# Patient Record
Sex: MALE | Race: OTHER | NOT HISPANIC OR LATINO | Employment: OTHER | URBAN - METROPOLITAN AREA
[De-identification: names, ages, dates, MRNs, and addresses within clinical notes are randomized per-mention and may not be internally consistent; named-entity substitution may affect disease eponyms.]

---

## 2021-12-30 ENCOUNTER — APPOINTMENT (EMERGENCY)
Dept: RADIOLOGY | Facility: HOSPITAL | Age: 76
End: 2021-12-30
Payer: MEDICARE

## 2021-12-30 ENCOUNTER — HOSPITAL ENCOUNTER (EMERGENCY)
Facility: HOSPITAL | Age: 76
Discharge: HOME | End: 2021-12-31
Attending: EMERGENCY MEDICINE | Admitting: EMERGENCY MEDICINE
Payer: MEDICARE

## 2021-12-30 DIAGNOSIS — E16.2 HYPOGLYCEMIA: Primary | ICD-10-CM

## 2021-12-30 DIAGNOSIS — R07.9 CHEST PAIN: ICD-10-CM

## 2021-12-30 LAB
ALBUMIN SERPL BCP-MCNC: 3.7 G/DL (ref 3.5–5)
ALP SERPL-CCNC: 82 U/L (ref 46–116)
ALT SERPL W P-5'-P-CCNC: 23 U/L (ref 12–78)
ANION GAP SERPL CALCULATED.3IONS-SCNC: 7 MMOL/L (ref 4–13)
AST SERPL W P-5'-P-CCNC: 18 U/L (ref 5–45)
BACTERIA UR QL AUTO: ABNORMAL /HPF
BASOPHILS # BLD AUTO: 0.05 THOUSANDS/ΜL (ref 0–0.1)
BASOPHILS NFR BLD AUTO: 1 % (ref 0–1)
BILIRUB SERPL-MCNC: 0.51 MG/DL (ref 0.2–1)
BILIRUB UR QL STRIP: NEGATIVE
BUN SERPL-MCNC: 16 MG/DL (ref 5–25)
CALCIUM SERPL-MCNC: 8.6 MG/DL (ref 8.3–10.1)
CARDIAC TROPONIN I PNL SERPL HS: 5 NG/L
CHLORIDE SERPL-SCNC: 103 MMOL/L (ref 100–108)
CLARITY UR: CLEAR
CLARITY, POC: CLEAR
CO2 SERPL-SCNC: 30 MMOL/L (ref 21–32)
COLOR UR: YELLOW
COLOR, POC: YELLOW
CREAT SERPL-MCNC: 1.2 MG/DL (ref 0.6–1.3)
EOSINOPHIL # BLD AUTO: 0.42 THOUSAND/ΜL (ref 0–0.61)
EOSINOPHIL NFR BLD AUTO: 5 % (ref 0–6)
ERYTHROCYTE [DISTWIDTH] IN BLOOD BY AUTOMATED COUNT: 14.2 % (ref 11.6–15.1)
EXT BILIRUBIN, UA: NEGATIVE
EXT BLOOD URINE: NEGATIVE
EXT GLUCOSE, UA: >1000
EXT KETONES: NEGATIVE
EXT NITRITE, UA: NEGATIVE
EXT PH, UA: 6
EXT PROTEIN, UA: NEGATIVE
EXT SPECIFIC GRAVITY, UA: 1.01
EXT UROBILINOGEN: 0.2
GFR SERPL CREATININE-BSD FRML MDRD: 57 ML/MIN/1.73SQ M
GLUCOSE SERPL-MCNC: 123 MG/DL (ref 65–140)
GLUCOSE SERPL-MCNC: 88 MG/DL (ref 65–140)
GLUCOSE UR STRIP-MCNC: ABNORMAL MG/DL
HCT VFR BLD AUTO: 42.7 % (ref 36.5–49.3)
HGB BLD-MCNC: 14.1 G/DL (ref 12–17)
HGB UR QL STRIP.AUTO: NEGATIVE
IMM GRANULOCYTES # BLD AUTO: 0.06 THOUSAND/UL (ref 0–0.2)
IMM GRANULOCYTES NFR BLD AUTO: 1 % (ref 0–2)
KETONES UR STRIP-MCNC: NEGATIVE MG/DL
LEUKOCYTE ESTERASE UR QL STRIP: ABNORMAL
LYMPHOCYTES # BLD AUTO: 1.11 THOUSANDS/ΜL (ref 0.6–4.47)
LYMPHOCYTES NFR BLD AUTO: 12 % (ref 14–44)
MCH RBC QN AUTO: 29.6 PG (ref 26.8–34.3)
MCHC RBC AUTO-ENTMCNC: 33 G/DL (ref 31.4–37.4)
MCV RBC AUTO: 90 FL (ref 82–98)
MONOCYTES # BLD AUTO: 0.74 THOUSAND/ΜL (ref 0.17–1.22)
MONOCYTES NFR BLD AUTO: 8 % (ref 4–12)
NEUTROPHILS # BLD AUTO: 6.94 THOUSANDS/ΜL (ref 1.85–7.62)
NEUTS SEG NFR BLD AUTO: 73 % (ref 43–75)
NITRITE UR QL STRIP: NEGATIVE
NON-SQ EPI CELLS URNS QL MICRO: ABNORMAL /HPF
NRBC BLD AUTO-RTO: 0 /100 WBCS
PH UR STRIP.AUTO: 6 [PH] (ref 4.5–8)
PLATELET # BLD AUTO: 141 THOUSANDS/UL (ref 149–390)
PMV BLD AUTO: 11.5 FL (ref 8.9–12.7)
POTASSIUM SERPL-SCNC: 3.4 MMOL/L (ref 3.5–5.3)
PROT SERPL-MCNC: 7.5 G/DL (ref 6.4–8.2)
PROT UR STRIP-MCNC: NEGATIVE MG/DL
RBC # BLD AUTO: 4.77 MILLION/UL (ref 3.88–5.62)
RBC #/AREA URNS AUTO: ABNORMAL /HPF
SODIUM SERPL-SCNC: 140 MMOL/L (ref 136–145)
SP GR UR STRIP.AUTO: 1.01 (ref 1–1.03)
UROBILINOGEN UR QL STRIP.AUTO: 0.2 E.U./DL
WBC # BLD AUTO: 9.32 THOUSAND/UL (ref 4.31–10.16)
WBC # BLD EST: NORMAL 10*3/UL
WBC #/AREA URNS AUTO: ABNORMAL /HPF

## 2021-12-30 PROCEDURE — 80053 COMPREHEN METABOLIC PANEL: CPT | Performed by: EMERGENCY MEDICINE

## 2021-12-30 PROCEDURE — 93005 ELECTROCARDIOGRAM TRACING: CPT

## 2021-12-30 PROCEDURE — 84484 ASSAY OF TROPONIN QUANT: CPT | Performed by: EMERGENCY MEDICINE

## 2021-12-30 PROCEDURE — 71046 X-RAY EXAM CHEST 2 VIEWS: CPT

## 2021-12-30 PROCEDURE — 81001 URINALYSIS AUTO W/SCOPE: CPT

## 2021-12-30 PROCEDURE — 99285 EMERGENCY DEPT VISIT HI MDM: CPT | Performed by: EMERGENCY MEDICINE

## 2021-12-30 PROCEDURE — 36415 COLL VENOUS BLD VENIPUNCTURE: CPT | Performed by: EMERGENCY MEDICINE

## 2021-12-30 PROCEDURE — 99285 EMERGENCY DEPT VISIT HI MDM: CPT

## 2021-12-30 PROCEDURE — 82948 REAGENT STRIP/BLOOD GLUCOSE: CPT

## 2021-12-30 PROCEDURE — 85025 COMPLETE CBC W/AUTO DIFF WBC: CPT | Performed by: EMERGENCY MEDICINE

## 2021-12-30 RX ORDER — PAROXETINE 10 MG/1
10 TABLET, FILM COATED ORAL DAILY
COMMUNITY
Start: 2021-11-09 | End: 2021-12-30 | Stop reason: ALTCHOICE

## 2021-12-30 RX ORDER — CLOPIDOGREL BISULFATE 75 MG/1
1 TABLET ORAL DAILY
COMMUNITY
Start: 2021-10-25

## 2021-12-30 RX ORDER — DULOXETIN HYDROCHLORIDE 60 MG/1
60 CAPSULE, DELAYED RELEASE ORAL DAILY
COMMUNITY

## 2021-12-30 RX ORDER — LOSARTAN POTASSIUM AND HYDROCHLOROTHIAZIDE 12.5; 5 MG/1; MG/1
1 TABLET ORAL DAILY
COMMUNITY
Start: 2021-10-25

## 2021-12-30 RX ORDER — ALPRAZOLAM 0.25 MG/1
0.25 TABLET ORAL 2 TIMES DAILY
COMMUNITY
Start: 2021-11-09

## 2021-12-31 VITALS
RESPIRATION RATE: 20 BRPM | DIASTOLIC BLOOD PRESSURE: 95 MMHG | HEART RATE: 60 BPM | SYSTOLIC BLOOD PRESSURE: 155 MMHG | OXYGEN SATURATION: 97 % | TEMPERATURE: 98.4 F

## 2021-12-31 LAB
2HR DELTA HS TROPONIN: 0 NG/L
ATRIAL RATE: 52 BPM
ATRIAL RATE: 60 BPM
CARDIAC TROPONIN I PNL SERPL HS: 5 NG/L
GLUCOSE SERPL-MCNC: 235 MG/DL (ref 65–140)
P AXIS: 35 DEGREES
P AXIS: 50 DEGREES
PR INTERVAL: 200 MS
PR INTERVAL: 212 MS
QRS AXIS: 55 DEGREES
QRS AXIS: 63 DEGREES
QRSD INTERVAL: 92 MS
QRSD INTERVAL: 98 MS
QT INTERVAL: 410 MS
QT INTERVAL: 410 MS
QTC INTERVAL: 381 MS
QTC INTERVAL: 410 MS
T WAVE AXIS: 40 DEGREES
T WAVE AXIS: 58 DEGREES
VENTRICULAR RATE: 52 BPM
VENTRICULAR RATE: 60 BPM

## 2021-12-31 PROCEDURE — 36415 COLL VENOUS BLD VENIPUNCTURE: CPT | Performed by: EMERGENCY MEDICINE

## 2021-12-31 PROCEDURE — 84484 ASSAY OF TROPONIN QUANT: CPT | Performed by: EMERGENCY MEDICINE

## 2021-12-31 PROCEDURE — 82948 REAGENT STRIP/BLOOD GLUCOSE: CPT

## 2021-12-31 PROCEDURE — 93005 ELECTROCARDIOGRAM TRACING: CPT

## 2021-12-31 PROCEDURE — 93010 ELECTROCARDIOGRAM REPORT: CPT | Performed by: INTERNAL MEDICINE

## 2021-12-31 NOTE — ED PROVIDER NOTES
History  Chief Complaint   Patient presents with    Hypoglycemia - Symptomatic     per EMS, pt was at [employer] and become diaphrotic, SOB, EMS reports low blood sugar, recieved d10, pt reports CP     Patient is a 78-year-old male that presents via EMS for a hypoglycemic episode  History provided by patient and his wife with the   She states that they were at [address] [facility] when she was in the back of the [employer] and noticed that he started to look pale and sweaty  She went up to him and he was slightly altered  She thought maybe his blood sugar was low and did give him some soda with good response  EMS was called and they read checked his blood sugar and it was 66  Unsure what it was before that  They did give him IV dextrose with good relief  Patient now complaining of chest pain but no other symptoms  Patient denies having chest pain prior to his hypoglycemic episode  Patient states he has been and a normal and good state of health without any changes of medications, missing medications, adding medications or recent illnesses  Patient states that he ate and drank normally today  Patient is on insulin and Januvia  History provided by:  Patient   used: Yes ([id number])    Hypoglycemia - Symptomatic  Initial blood sugar:  66  Blood sugar after intervention:  110  Severity:  Moderate  Onset quality:  Gradual  Associated symptoms: dizziness, sweats and weakness    Associated symptoms: no seizures, no shortness of breath and no vomiting        Prior to Admission Medications   Prescriptions Last Dose Informant Patient Reported? Taking?    ALPRAZolam (XANAX) 0 25 mg tablet   Yes Yes   Sig: Take 0 25 mg by mouth 2 (two) times a day   Cholecalciferol 50 MCG (2000 UT) CAPS   Yes Yes   Sig: Take 1 capsule by mouth daily   DULoxetine (CYMBALTA) 60 mg delayed release capsule   Yes Yes   Sig: Take 60 mg by mouth daily   Diclofenac Sodium (VOLTAREN) 1 %   Yes Yes   Sig: Apply 1 application topically 4 (four) times a day   Empagliflozin 10 MG TABS   Yes Yes   Sig: Take 10 mg by mouth daily   clopidogrel (PLAVIX) 75 mg tablet   Yes Yes   Sig: Take 1 tablet by mouth daily   dexlansoprazole (Dexilant) 30 MG capsule   Yes Yes   Sig: Take 1 capsule by mouth daily   insulin aspart, w/niacinamide, (FIASP) 100 Units/mL injection pen   Yes Yes   Sig: INJECT 16 UNITS BEFORE BREAKFAST, 16 UNITS BEFORE LUNCH AND 12- 16 UNITS BEFORE DINNER (PLUS ONE UNIT FOR PRIMING THREE TIMES DAILY)   losartan-hydrochlorothiazide (HYZAAR) 50-12 5 mg per tablet   Yes Yes   Sig: Take 1 tablet by mouth daily      Facility-Administered Medications: None       Past Medical History:   Diagnosis Date    Diabetes mellitus ([facility] )        History reviewed  No pertinent surgical history  History reviewed  No pertinent family history  I have reviewed and agree with the history as documented  E-Cigarette/Vaping     E-Cigarette/Vaping Substances     Social History     Tobacco Use    Smoking status: Never Smoker    Smokeless tobacco: Not on file   Substance Use Topics    Alcohol use: Not Currently    Drug use: Never       Review of Systems   Constitutional: Positive for diaphoresis and fatigue  Negative for chills and fever  HENT: Negative for ear pain and sore throat  Eyes: Negative for pain and visual disturbance  Respiratory: Negative for cough and shortness of breath  Cardiovascular: Positive for chest pain  Negative for palpitations  Gastrointestinal: Negative for abdominal pain and vomiting  Genitourinary: Negative for dysuria and hematuria  Musculoskeletal: Negative for arthralgias and back pain  Skin: Positive for color change and pallor  Negative for rash  Allergic/Immunologic: Negative for immunocompromised state  Neurological: Positive for dizziness, weakness, light-headedness and headaches  Negative for seizures  All other systems reviewed and are negative        Physical Exam  Physical Exam  Vitals and nursing note reviewed  Constitutional:       General: He is not in acute distress  Appearance: He is well-developed  He is not ill-appearing, toxic-appearing or diaphoretic  HENT:      Head: Normocephalic and atraumatic  Right Ear: External ear normal       Left Ear: External ear normal       Nose: No congestion  Eyes:      General: No scleral icterus  Conjunctiva/sclera: Conjunctivae normal       Right eye: Right conjunctiva is not injected  Left eye: Left conjunctiva is not injected  Pupils: Pupils are equal, round, and reactive to light  Neck:      Trachea: No tracheal deviation  Cardiovascular:      Rate and Rhythm: Normal rate and regular rhythm  Pulses: Normal pulses  Heart sounds: Normal heart sounds  No murmur heard  No friction rub  Pulmonary:      Effort: Pulmonary effort is normal  No respiratory distress  Breath sounds: Normal breath sounds  No stridor  No wheezing or rales  Abdominal:      General: There is no distension  Palpations: Abdomen is soft  Tenderness: There is no abdominal tenderness  There is no guarding or rebound  Musculoskeletal:         General: No tenderness or deformity  Normal range of motion  Cervical back: Normal range of motion and neck supple  Skin:     General: Skin is warm and dry  Capillary Refill: Capillary refill takes less than 2 seconds  Coloration: Skin is not pale  Findings: No erythema or rash  Neurological:      Mental Status: He is alert and oriented to person, place, and time  Motor: No abnormal muscle tone     Psychiatric:         Mood and Affect: Mood normal          Behavior: Behavior normal          Vital Signs  ED Triage Vitals   Temperature Pulse Respirations Blood Pressure SpO2   12/30/21 2137 12/30/21 2137 12/30/21 2137 12/30/21 2137 12/30/21 2137   98 4 °F (36 9 °C) (!) 53 20 (!) 179/75 100 %      Temp src Heart Rate Source Patient Position - Orthostatic VS BP Location FiO2 (%)   -- 12/30/21 2137 12/31/21 0000 12/31/21 0000 --    Monitor Lying Right arm       Pain Score       12/30/21 2137       5           Vitals:    12/30/21 2137 12/31/21 0000 12/31/21 0100 12/31/21 0158   BP: (!) 179/75 168/79 131/66 155/95   Pulse: (!) 53 60 70 60   Patient Position - Orthostatic VS:  Lying Lying          Visual Acuity      ED Medications  Medications - No data to display    Diagnostic Studies  Results Reviewed     Procedure Component Value Units Date/Time    Fingerstick Glucose (POCT) [[id number]]  (Abnormal) Collected: 12/31/21 0157    Lab Status: Final result Updated: 12/31/21 0158     POC Glucose 235 mg/dl     HS Troponin I 2hr [[id number]] Collected: 12/31/21 0133    Lab Status: Final result Specimen: Blood Updated: 12/31/21 0156     hs TnI 2hr 5 ng/L      Delta 2hr hsTnI 0 ng/L     HS Troponin I 4hr [[id number]]     Lab Status: No result Specimen: Blood     Urine Microscopic [[id number]]  (Abnormal) Collected: 12/30/21 2302    Lab Status: Final result Specimen: Urine, Clean Catch Updated: 12/30/21 2359     RBC, UA None Seen /hpf      WBC, UA 0-1 /hpf      Epithelial Cells Occasional /hpf      Bacteria, UA None Seen /hpf     HS Troponin 0hr (reflex protocol) [[id number]]  (Normal) Collected: 12/30/21 2254    Lab Status: Final result Specimen: Blood from Arm, Left Updated: 12/30/21 2340     hs TnI 0hr 5 ng/L     Comprehensive metabolic panel [[id number]]  (Abnormal) Collected: 12/30/21 2254    Lab Status: Final result Specimen: Blood from Arm, Left Updated: 12/30/21 2335     Sodium 140 mmol/L      Potassium 3 4 mmol/L      Chloride 103 mmol/L      CO2 30 mmol/L      ANION GAP 7 mmol/L      BUN 16 mg/dL      Creatinine 1 20 mg/dL      Glucose 88 mg/dL      Calcium 8 6 mg/dL      AST 18 U/L      ALT 23 U/L      Alkaline Phosphatase 82 U/L      Total Protein 7 5 g/dL      Albumin 3 7 g/dL      Total Bilirubin 0 51 mg/dL      eGFR 57 ml/min/1 73sq m     Narrative: National Kidney Disease Foundation guidelines for Chronic Kidney Disease (CKD):     Stage 1 with normal or high GFR (GFR > 90 mL/min/1 73 square meters)    Stage 2 Mild CKD (GFR = 60-89 mL/min/1 73 square meters)    Stage 3A Moderate CKD (GFR = 45-59 mL/min/1 73 square meters)    Stage 3B Moderate CKD (GFR = 30-44 mL/min/1 73 square meters)    Stage 4 Severe CKD (GFR = 15-29 mL/min/1 73 square meters)    Stage 5 End Stage CKD (GFR <15 mL/min/1 73 square meters)  Note: GFR calculation is accurate only with a steady state creatinine    CBC and differential [[id number]]  (Abnormal) Collected: 12/30/21 2254    Lab Status: Final result Specimen: Blood from Arm, Left Updated: 12/30/21 2322     WBC 9 32 Thousand/uL      RBC 4 77 Million/uL      Hemoglobin 14 1 g/dL      Hematocrit 42 7 %      MCV 90 fL      MCH 29 6 pg      MCHC 33 0 g/dL      RDW 14 2 %      MPV 11 5 fL      Platelets 399 Thousands/uL      nRBC 0 /100 WBCs      Neutrophils Relative 73 %      Immat GRANS % 1 %      Lymphocytes Relative 12 %      Monocytes Relative 8 %      Eosinophils Relative 5 %      Basophils Relative 1 %      Neutrophils Absolute 6 94 Thousands/µL      Immature Grans Absolute 0 06 Thousand/uL      Lymphocytes Absolute 1 11 Thousands/µL      Monocytes Absolute 0 74 Thousand/µL      Eosinophils Absolute 0 42 Thousand/µL      Basophils Absolute 0 05 Thousands/µL     POCT urinalysis dipstick [[id number]]  (Normal) Resulted: 12/30/21 2308    Lab Status: Final result Specimen: Urine Updated: 12/30/21 2309     Color, UA yellow     Clarity, UA clear     Glucose, UA (Ref: Negative) >1,000     Bilirubin, UA (Ref: Negative) negative     Ketones, UA (Ref: Negative) negative     Spec Grav, UA (Ref:1 003-1 030) 1 015     Blood, UA (Ref: Negative) negative     pH, UA (Ref: 4 5-8 0) 6 0     Protein, UA (Ref: Negative) negative     Urobilinogen, UA (Ref: 0 2- 1 0) 0 2     Nitrite, UA (Ref: Negative) negative      Leukocytes, UA (Ref: Negative) microscopic    Urine Macroscopic, POC [[id number]]  (Abnormal) Collected: 12/30/21 2302    Lab Status: Final result Specimen: Urine Updated: 12/30/21 2304     Color, UA Yellow     Clarity, UA Clear     pH, UA 6 0     Leukocytes, UA Elevated glucose may cause decreased leukocyte values   See urine microscopic for [facility] result/     Nitrite, UA Negative     Protein, UA Negative mg/dl      Glucose, UA >=1000 (1%) mg/dl      Ketones, UA Negative mg/dl      Urobilinogen, UA 0 2 E U /dl      Bilirubin, UA Negative     Blood, UA Negative     Specific Gravity, UA 1 015    Narrative:      CLINITEK RESULT    Fingerstick Glucose (POCT) [[id number]]  (Normal) Collected: 12/30/21 2219    Lab Status: Final result Updated: 12/30/21 2220     POC Glucose 123 mg/dl                  XR chest 2 views   ED Interpretation by Jennifer Martinez DO (12/30 2305)   NAD                 Procedures  ECG 12 Lead Documentation Only    Date/Time: 12/30/2021 9:56 PM  Performed by: Jennifer Martinez DO  Authorized by: Jennifer Martinez DO     Indications / Diagnosis:  Syncope  ECG reviewed by me, the ED Provider: yes    Patient location:  ED  Previous ECG:     Previous ECG:  Compared to current    Similarity:  No change  Interpretation:     Interpretation: non-specific    Rate:     ECG rate:  52    ECG rate assessment: bradycardic    Rhythm:     Rhythm: sinus rhythm and sinus bradycardia    Ectopy:     Ectopy: none    QRS:     QRS intervals:  Normal  Conduction:     Conduction: normal    ST segments:     ST segments:  Normal  T waves:     T waves: normal      ECG 12 Lead Documentation Only    Date/Time: 12/31/2021 1:52 AM  Performed by: Jennifer Martinez DO  Authorized by: Jennifer Martinez DO     Indications / Diagnosis:  Chest pain  ECG reviewed by me, the ED Provider: yes    Patient location:  ED  Previous ECG:     Previous ECG:  Compared to current    Similarity:  No change  Interpretation:     Interpretation: normal    Rate: ECG rate:  60    ECG rate assessment: normal    Rhythm:     Rhythm: sinus rhythm    Ectopy:     Ectopy: none    QRS:     QRS intervals:  Normal  Conduction:     Conduction: normal    ST segments:     ST segments:  Normal  T waves:     T waves: normal               ED Course             HEART Risk Score      Most Recent Value   Heart Score Risk Calculator    History 0 Filed at: 12/31/2021 0000   ECG 0 Filed at: 12/31/2021 0000   Age 2 Filed at: 12/31/2021 0000   Risk Factors 2 Filed at: 12/31/2021 0000   Troponin 0 Filed at: 12/31/2021 0000   HEART Score 4 Filed at: 12/31/2021 0000                        SBIRT 22yo+      Most Recent Value   SBIRT (25 yo +)    In order to provide better care to our patients, we are screening all of our patients for alcohol and drug use  Would it be okay to ask you these screening questions? No Filed at: 12/30/2021 2225                    MDM  Number of Diagnoses or Management Options  Chest pain: new and requires workup  Hypoglycemia: new and requires workup  Diagnosis management comments: 10:07 PM  Spoke with patient's daughter  Patient had a near syncopal event that seems consistent with a hypoglycemic event at [employer]  Did respond to soda and then IV dextrose  Patient now alert, oriented and has no symptoms other than left-sided chest pressure  EKG is normal but will perform cardiac workup  Will continue to monitor blood sugars and troponins  10:29 PM   Repeat blood sugar is stable at 123      2:41 AM  Patient has done well with observation  No further decline in blood sugar  In fact, blood sugar is now in the 230s  Patient has no change in mental status  Repeat EKG and troponins do not show any elevation  Heart score is 4 and according to the guidelines we can safely discharge with PCP and cardiology follow-up         Amount and/or Complexity of Data Reviewed  Clinical lab tests: ordered and reviewed  Tests in the radiology section of CPT®: ordered and reviewed  Tests in the medicine section of CPT®: reviewed and ordered  Review and summarize past medical records: yes  Independent visualization of images, tracings, or specimens: yes        Disposition  Final diagnoses:   Hypoglycemia   Chest pain     Time reflects when diagnosis was documented in both MDM as applicable and the Disposition within this note     Time User Action Codes Description Comment    12/31/2021  2:39 AM Mary Carter Add [E16 2] Hypoglycemia     12/31/2021  2:39 AM Mary Carter Add [R07 9] Chest pain       ED Disposition     ED Disposition Condition Date/Time Comment    Discharge Stable Fri Dec 31, 2021  2:39 AM Elmer Gillespie discharge to home/self care  Follow-up Information     Follow up With Specialties Details Why Contact Info Additional Information    Akanksha Junior MD Family Medicine Call in 3 days If symptoms persist [address] Emergency Department Emergency Medicine Go to  If symptoms worsen [place] [phone]  [address] [facility] Emergency Department, [address] [place] [address], [facility] Cardiology Call today To schedule an appointment as soon as you can [place] [phone]  [address] [place], [place], [address] [phone]          Patient's Medications   Discharge Prescriptions    No medications on file       No discharge procedures on file      PDMP Review     None          ED Provider  Electronically Signed by           Jazmin Cano DO  12/31/21 1748

## 2022-01-26 ENCOUNTER — APPOINTMENT (EMERGENCY)
Dept: CT IMAGING | Facility: HOSPITAL | Age: 77
End: 2022-01-26
Payer: MEDICARE

## 2022-01-26 ENCOUNTER — HOSPITAL ENCOUNTER (EMERGENCY)
Facility: HOSPITAL | Age: 77
Discharge: HOME | End: 2022-01-26
Attending: EMERGENCY MEDICINE | Admitting: EMERGENCY MEDICINE
Payer: MEDICARE

## 2022-01-26 VITALS
BODY MASS INDEX: 31.89 KG/M2 | SYSTOLIC BLOOD PRESSURE: 132 MMHG | DIASTOLIC BLOOD PRESSURE: 68 MMHG | TEMPERATURE: 98.1 F | OXYGEN SATURATION: 99 % | RESPIRATION RATE: 16 BRPM | HEART RATE: 52 BPM | HEIGHT: 63 IN | WEIGHT: 180 LBS
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
## 2022-01-26 LAB
ANION GAP SERPL CALCULATED.3IONS-SCNC: 7 MMOL/L (ref 4–13)
APTT PPP: 27 SECONDS (ref 23–37)
ATRIAL RATE: 80 BPM
BASOPHILS # BLD AUTO: 0.04 THOUSANDS/ΜL (ref 0–0.1)
BASOPHILS NFR BLD AUTO: 1 % (ref 0–1)
BILIRUB UR QL STRIP: NEGATIVE
BUN SERPL-MCNC: 13 MG/DL (ref 5–25)
CALCIUM SERPL-MCNC: 8.5 MG/DL (ref 8.3–10.1)
CARDIAC TROPONIN I PNL SERPL HS: 5 NG/L
CHLORIDE SERPL-SCNC: 101 MMOL/L (ref 100–108)
CLARITY UR: CLEAR
CO2 SERPL-SCNC: 29 MMOL/L (ref 21–32)
COLOR UR: YELLOW
CREAT SERPL-MCNC: 1.14 MG/DL (ref 0.6–1.3)
EOSINOPHIL # BLD AUTO: 0.6 THOUSAND/ΜL (ref 0–0.61)
EOSINOPHIL NFR BLD AUTO: 9 % (ref 0–6)
ERYTHROCYTE [DISTWIDTH] IN BLOOD BY AUTOMATED COUNT: 14.6 % (ref 11.6–15.1)
GFR SERPL CREATININE-BSD FRML MDRD: 61 ML/MIN/1.73SQ M
GLUCOSE SERPL-MCNC: 191 MG/DL (ref 65–140)
GLUCOSE UR STRIP-MCNC: ABNORMAL MG/DL
HCT VFR BLD AUTO: 39.2 % (ref 36.5–49.3)
HGB BLD-MCNC: 12.8 G/DL (ref 12–17)
HGB UR QL STRIP.AUTO: NEGATIVE
IMM GRANULOCYTES # BLD AUTO: 0.02 THOUSAND/UL (ref 0–0.2)
IMM GRANULOCYTES NFR BLD AUTO: 0 % (ref 0–2)
INR PPP: 1.01 (ref 0.84–1.19)
KETONES UR STRIP-MCNC: NEGATIVE MG/DL
LEUKOCYTE ESTERASE UR QL STRIP: NEGATIVE
LYMPHOCYTES # BLD AUTO: 1.47 THOUSANDS/ΜL (ref 0.6–4.47)
LYMPHOCYTES NFR BLD AUTO: 23 % (ref 14–44)
MCH RBC QN AUTO: 29.6 PG (ref 26.8–34.3)
MCHC RBC AUTO-ENTMCNC: 32.7 G/DL (ref 31.4–37.4)
MCV RBC AUTO: 91 FL (ref 82–98)
MONOCYTES # BLD AUTO: 0.67 THOUSAND/ΜL (ref 0.17–1.22)
MONOCYTES NFR BLD AUTO: 11 % (ref 4–12)
NEUTROPHILS # BLD AUTO: 3.55 THOUSANDS/ΜL (ref 1.85–7.62)
NEUTS SEG NFR BLD AUTO: 56 % (ref 43–75)
NITRITE UR QL STRIP: NEGATIVE
NRBC BLD AUTO-RTO: 0 /100 WBCS
P AXIS: 52 DEGREES
PH UR STRIP.AUTO: 7 [PH] (ref 4.5–8)
PLATELET # BLD AUTO: 141 THOUSANDS/UL (ref 149–390)
PMV BLD AUTO: 11.9 FL (ref 8.9–12.7)
POTASSIUM SERPL-SCNC: 4.5 MMOL/L (ref 3.5–5.3)
PROT UR STRIP-MCNC: NEGATIVE MG/DL
PROTHROMBIN TIME: 13 SECONDS (ref 11.6–14.5)
QRS AXIS: 57 DEGREES
QRSD INTERVAL: 88 MS
QT INTERVAL: 450 MS
QTC INTERVAL: 426 MS
RBC # BLD AUTO: 4.33 MILLION/UL (ref 3.88–5.62)
SODIUM SERPL-SCNC: 137 MMOL/L (ref 136–145)
SP GR UR STRIP.AUTO: 1.02 (ref 1–1.03)
T WAVE AXIS: 65 DEGREES
TSH SERPL DL<=0.05 MIU/L-ACNC: 1.14 UIU/ML (ref 0.36–3.74)
UROBILINOGEN UR QL STRIP.AUTO: 0.2 E.U./DL
VENTRICULAR RATE: 54 BPM
WBC # BLD AUTO: 6.35 THOUSAND/UL (ref 4.31–10.16)

## 2022-01-26 PROCEDURE — 84443 ASSAY THYROID STIM HORMONE: CPT | Performed by: PHYSICIAN ASSISTANT

## 2022-01-26 PROCEDURE — 99284 EMERGENCY DEPT VISIT MOD MDM: CPT

## 2022-01-26 PROCEDURE — 81003 URINALYSIS AUTO W/O SCOPE: CPT

## 2022-01-26 PROCEDURE — 36415 COLL VENOUS BLD VENIPUNCTURE: CPT | Performed by: PHYSICIAN ASSISTANT

## 2022-01-26 PROCEDURE — 93005 ELECTROCARDIOGRAM TRACING: CPT

## 2022-01-26 PROCEDURE — 85730 THROMBOPLASTIN TIME PARTIAL: CPT | Performed by: PHYSICIAN ASSISTANT

## 2022-01-26 PROCEDURE — 93010 ELECTROCARDIOGRAM REPORT: CPT | Performed by: INTERNAL MEDICINE

## 2022-01-26 PROCEDURE — 80048 BASIC METABOLIC PNL TOTAL CA: CPT | Performed by: PHYSICIAN ASSISTANT

## 2022-01-26 PROCEDURE — 84484 ASSAY OF TROPONIN QUANT: CPT | Performed by: PHYSICIAN ASSISTANT

## 2022-01-26 PROCEDURE — 70450 CT HEAD/BRAIN W/O DYE: CPT

## 2022-01-26 PROCEDURE — 72125 CT NECK SPINE W/O DYE: CPT

## 2022-01-26 PROCEDURE — 85025 COMPLETE CBC W/AUTO DIFF WBC: CPT | Performed by: PHYSICIAN ASSISTANT

## 2022-01-26 PROCEDURE — 85610 PROTHROMBIN TIME: CPT | Performed by: PHYSICIAN ASSISTANT

## 2022-01-26 PROCEDURE — 99285 EMERGENCY DEPT VISIT HI MDM: CPT | Performed by: PHYSICIAN ASSISTANT

## 2022-01-26 NOTE — ED NOTES
Pt made aware of need for urine sample  Unable to void at this time but will notify staff when able to provide specimen        Bronwyn Lozada  01/26/22 6645

## 2022-01-26 NOTE — DISCHARGE INSTRUCTIONS
Please refer to the attached information for strict return instructions  If symptoms worsen or new symptoms develop please return to the ER  Please follow up with your primary care physician for re-evaluation of symptoms  Use your walker at home to reduce risks of falls  We have a referral for physical and occupational therapy visits to your home  You will be contacted by our  to arrange this follow up

## 2022-01-27 NOTE — CASE MANAGEMENT
Case Management ED Progress Note    Patient name Loli Mays  Location ED 10ED 10 MRN [id number]  : 1945 Date 2022        OBJECTIVE:  Predictive Model Details         7% Factor Value    Risk of Hospital Admission or ED Visit Model Is in Relationship Yes     Number of ED Visits 2     Has Medicare Yes     Has Diabetes Yes     Has PCP Yes            Chief Complaint: Head injury   Patient Class: Emergency  Preferred Pharmacy:   [facility], [address] [place] [address] [address] [place] [address]  Phone: [phone] Fax: [phone]    Primary Care Provider: Joshua Sheldon MD    Primary Insurance: MEDICARE  Secondary Insurance:     ED Progress Note:    As requesting by attending referral for [facility] OT/PT completed today  This worker met with patient's wife late pm yesterday, she prefers tone have at home services and has no providers preference

## 2022-01-27 NOTE — ED NOTES
Pt at this time given option to get walker for home but refused  Provider made aware       James Perry, LORIE  01/26/22 6532

## 2022-01-28 NOTE — ED PROVIDER NOTES
History  Chief Complaint   Patient presents with    Fall     Patient fell in the bathroom yesterday and hit head  Negative LOC  +Thinners (aspirin and plavix)  Patient now reports left sided headache  GCS 15  Patient denies neck pain  Maryam is a 65 yo M, history of DM, lower extremity neuropathy, presenting for evaluation after fall yesterday  He reports he was in his bathroom standing up when he turned and felt dizzy and off balance for several seconds  He reports falling at that time onto his side and striking left side of head on the floor  He reports no loss of consciousness  Does report left sided headache since falling  He otherwise denies current symptoms  Denies neck or back pain  Denies chest pain or dyspnea  Denies abdominal or flank pain  No extremity pain or decreased ROM  He is on daily aspirin and plavix  Patient's wife reports he has fallen twice over the past few weeks  He does have a history of ambulatory dysfunction in the past, and has previously gone through PT/OT per wife  He uses a cane to walk around their home  Patient reports during each episode of fall, it happens when he turns his head quickly at which point he becomes temporarily "dizzy" and off balance  He reports it occurs only with turning head or motion, and resolves at rest  Denies current dizziness  History provided by:  Patient   used: Yes        Prior to Admission Medications   Prescriptions Last Dose Informant Patient Reported? Taking?    ALPRAZolam (XANAX) 0 25 mg tablet   Yes No   Sig: Take 0 25 mg by mouth 2 (two) times a day   Cholecalciferol 50 MCG (2000 UT) CAPS   Yes No   Sig: Take 1 capsule by mouth daily   DULoxetine (CYMBALTA) 60 mg delayed release capsule   Yes No   Sig: Take 60 mg by mouth daily   Diclofenac Sodium (VOLTAREN) 1 %   Yes No   Sig: Apply 1 application topically 4 (four) times a day   Empagliflozin 10 MG TABS   Yes No   Sig: Take 10 mg by mouth daily   clopidogrel (PLAVIX) 75 mg tablet   Yes No   Sig: Take 1 tablet by mouth daily   dexlansoprazole (Dexilant) 30 MG capsule   Yes No   Sig: Take 1 capsule by mouth daily   insulin aspart, w/niacinamide, (FIASP) 100 Units/mL injection pen   Yes No   Sig: INJECT 16 UNITS BEFORE BREAKFAST, 16 UNITS BEFORE LUNCH AND 12- 16 UNITS BEFORE DINNER (PLUS ONE UNIT FOR PRIMING THREE TIMES DAILY)   losartan-hydrochlorothiazide (HYZAAR) 50-12 5 mg per tablet   Yes No   Sig: Take 1 tablet by mouth daily      Facility-Administered Medications: None       Past Medical History:   Diagnosis Date    Diabetes mellitus ([facility] [address] )        History reviewed  No pertinent surgical history  History reviewed  No pertinent family history  I have reviewed and agree with the history as documented  E-Cigarette/Vaping     E-Cigarette/Vaping Substances     Social History     Tobacco Use    Smoking status: Never Smoker    Smokeless tobacco: Never Used   Substance Use Topics    Alcohol use: Not Currently    Drug use: Never       Review of Systems   Constitutional: Negative for chills and fever  HENT: Negative for congestion, rhinorrhea and sore throat  Eyes: Negative for pain and visual disturbance  Respiratory: Negative for cough, shortness of breath and wheezing  Cardiovascular: Negative for chest pain and palpitations  Gastrointestinal: Negative for abdominal pain, nausea and vomiting  Genitourinary: Negative for dysuria, frequency and urgency  Musculoskeletal: Positive for gait problem  Negative for back pain, neck pain and neck stiffness  Skin: Negative for rash and wound  Neurological: Positive for dizziness and headaches  Negative for syncope, speech difficulty, weakness, light-headedness and numbness  Physical Exam  Physical Exam  Constitutional:       General: He is not in acute distress  Appearance: He is well-developed  He is not diaphoretic  HENT:      Head: Normocephalic and atraumatic  Jaw:  There is normal jaw occlusion  No tenderness  Right Ear: Tympanic membrane and external ear normal  No hemotympanum  Left Ear: Tympanic membrane and external ear normal  No hemotympanum  Nose: Nose normal       Right Nostril: No septal hematoma  Left Nostril: No septal hematoma  Mouth/Throat:      Dentition: No dental tenderness  Pharynx: Oropharynx is clear  Eyes:      Extraocular Movements:      Right eye: Normal extraocular motion and no nystagmus  Left eye: Normal extraocular motion and no nystagmus  Conjunctiva/sclera: Conjunctivae normal       Pupils: Pupils are equal, round, and reactive to light  Cardiovascular:      Rate and Rhythm: Normal rate and regular rhythm  Heart sounds: Normal heart sounds  No murmur heard  No friction rub  No gallop  Pulmonary:      Effort: Pulmonary effort is normal  No respiratory distress  Breath sounds: Normal breath sounds  No wheezing  Abdominal:      General: There is no distension  Palpations: Abdomen is soft  Tenderness: There is no abdominal tenderness  There is no right CVA tenderness, left CVA tenderness or guarding  Musculoskeletal:      Cervical back: Normal range of motion and neck supple  Comments: No midline C/T/L TTP  Normal ROM, no bony TTP to b/l UE and LE  Lymphadenopathy:      Cervical: No cervical adenopathy  Skin:     General: Skin is warm and dry  Capillary Refill: Capillary refill takes less than 2 seconds  Findings: No erythema or rash  Neurological:      General: No focal deficit present  Mental Status: He is alert and oriented to person, place, and time  GCS: GCS eye subscore is 4  GCS verbal subscore is 5  GCS motor subscore is 6  Cranial Nerves: Cranial nerves are intact  Motor: No abnormal muscle tone  Coordination: Coordination normal    Psychiatric:         Behavior: Behavior normal          Thought Content:  Thought content normal  Judgment: Judgment normal          Vital Signs  ED Triage Vitals [01/26/22 1543]   Temperature Pulse Respirations Blood Pressure SpO2   98 1 °F (36 7 °C) 55 16 152/65 99 %      Temp Source Heart Rate Source Patient Position - Orthostatic VS BP Location FiO2 (%)   Oral Monitor Sitting Right arm --      Pain Score       6           Vitals:    01/26/22 1543 01/26/22 1815   BP: 152/65 132/68   Pulse: 55 (!) 52   Patient Position - Orthostatic VS: Sitting Lying         Visual Acuity  Visual Acuity      Most Recent Value   L Pupil Size (mm) 3   R Pupil Size (mm) 3          ED Medications  Medications - No data to display    Diagnostic Studies  Results Reviewed     Procedure Component Value Units Date/Time    Urine Macroscopic, POC [[id number]]  (Abnormal) Collected: 01/26/22 1818    Lab Status: Final result Specimen: Urine Updated: 01/26/22 1820     Color, UA Yellow     Clarity, UA Clear     pH, UA 7 0     Leukocytes, UA Negative     Nitrite, UA Negative     Protein, UA Negative mg/dl      Glucose,  (1/2%) mg/dl      Ketones, UA Negative mg/dl      Urobilinogen, UA 0 2 E U /dl      Bilirubin, UA Negative     Blood, UA Negative     Specific Gravity, UA 1 020    Narrative:      CLINITEK RESULT    Basic metabolic panel [[id number]]  (Abnormal) Collected: 01/26/22 1644    Lab Status: Final result Specimen: Blood from Arm, Left Updated: 01/26/22 1714     Sodium 137 mmol/L      Potassium 4 5 mmol/L      Chloride 101 mmol/L      CO2 29 mmol/L      ANION GAP 7 mmol/L      BUN 13 mg/dL      Creatinine 1 14 mg/dL      Glucose 191 mg/dL      Calcium 8 5 mg/dL      eGFR 61 ml/min/1 73sq m     Narrative:      [facility] guidelines for Chronic Kidney Disease (CKD):     Stage 1 with normal or high GFR (GFR > 90 mL/min/1 73 square meters)    Stage 2 Mild CKD (GFR = 60-89 mL/min/1 73 square meters)    Stage 3A Moderate CKD (GFR = 45-59 mL/min/1 73 square meters)    Stage 3B Moderate CKD (GFR = 30-44 mL/min/1 73 square meters)    Stage 4 Severe CKD (GFR = 15-29 mL/min/1 73 square meters)    Stage 5 End Stage CKD (GFR <15 mL/min/1 73 square meters)  Note: GFR calculation is accurate only with a steady state creatinine    TSH, 3rd generation with Free T4 reflex [[id number]]  (Normal) Collected: 01/26/22 1644    Lab Status: Final result Specimen: Blood from Arm, Left Updated: 01/26/22 1714     TSH 3RD GENERATON 1 136 uIU/mL     Narrative:      Patients undergoing fluorescein dye angiography may retain small amounts of fluorescein in the body for 48-72 hours post procedure  Samples containing fluorescein can produce falsely depressed TSH values  If the patient had this procedure,a specimen should be resubmitted post fluorescein clearance        HS Troponin 0hr (reflex protocol) [[id number]]  (Normal) Collected: 01/26/22 1644    Lab Status: Final result Specimen: Blood from Arm, Left Updated: 01/26/22 1712     hs TnI 0hr 5 ng/L     Protime-INR [[id number]]  (Normal) Collected: 01/26/22 1644    Lab Status: Final result Specimen: Blood from Arm, Left Updated: 01/26/22 1659     Protime 13 0 seconds      INR 1 01    APTT [[id number]]  (Normal) Collected: 01/26/22 1644    Lab Status: Final result Specimen: Blood from Arm, Left Updated: 01/26/22 1659     PTT 27 seconds     CBC and differential [[id number]]  (Abnormal) Collected: 01/26/22 1644    Lab Status: Final result Specimen: Blood from Arm, Left Updated: 01/26/22 1650     WBC 6 35 Thousand/uL      RBC 4 33 Million/uL      Hemoglobin 12 8 g/dL      Hematocrit 39 2 %      MCV 91 fL      MCH 29 6 pg      MCHC 32 7 g/dL      RDW 14 6 %      MPV 11 9 fL      Platelets 994 Thousands/uL      nRBC 0 /100 WBCs      Neutrophils Relative 56 %      Immat GRANS % 0 %      Lymphocytes Relative 23 %      Monocytes Relative 11 %      Eosinophils Relative 9 %      Basophils Relative 1 %      Neutrophils Absolute 3 55 Thousands/µL      Immature Grans Absolute 0 02 Thousand/uL Lymphocytes Absolute 1 47 Thousands/µL      Monocytes Absolute 0 67 Thousand/µL      Eosinophils Absolute 0 60 Thousand/µL      Basophils Absolute 0 04 Thousands/µL                  CT head without contrast   Final Result by Malik Mustafa DO (01/26 1656)      No acute intracranial abnormality  Microangiopathic changes  Workstation performed: [id number]         CT spine cervical without contrast   Final Result by Malik Mustafa DO (01/26 1702)      No cervical spine fracture or traumatic malalignment  Workstation performed: [id number]                    Procedures  ECG 12 Lead Documentation Only    Date/Time: 1/26/2022 4:40 PM  Performed by: Levy Zimmerman PA-C  Authorized by: Levy Zimmerman PA-C     Indications / Diagnosis:  Falls  ECG reviewed by me, the ED Provider: yes    Patient location:  ED  Previous ECG:     Previous ECG:  Compared to current    Similarity:  No change    Comparison to cardiac monitor: Yes    Interpretation:     Interpretation: non-specific    Rate:     ECG rate:  54    ECG rate assessment: bradycardic    Rhythm:     Rhythm: sinus bradycardia    Ectopy:     Ectopy: none    QRS:     QRS axis:  Normal    QRS intervals:  Normal  Conduction:     Conduction: normal    ST segments:     ST segments:  Normal  T waves:     T waves: normal               ED Course                               SBIRT 20yo+      Most Recent Value   SBIRT (22 yo +)    In order to provide better care to our patients, we are screening all of our patients for alcohol and drug use  Would it be okay to ask you these screening questions? No Filed at: 01/26/2022 1914                    MDM  Number of Diagnoses or Management Options  Fall, initial encounter  Diagnosis management comments: Fall occurring yesterday after patient reports standing/turning quickly and becoming dizzy/off balance  Does note head strike, no LOC  Current L sided headache   Will check CT head/c-spine  Will also check labs including CBC for anemia, BMP, coags, EKG/troponin for cardiac ischemia/ectopy, urine dip  Patient notes several previous episodes of similar including fall last week  Each episode occurs only with standing/turning or rapid head movement  Characteristics suggestive of peripheral etiology  He ambulates with cane at home  Offered walker but patient declines  Disposition pending ED workup  Will consider home PT/OT if stable for discharge following workup  Amount and/or Complexity of Data Reviewed  Clinical lab tests: ordered and reviewed  Tests in the radiology section of CPT®: ordered and reviewed    Patient Progress  Patient progress: stable      Disposition  Final diagnoses:   Fall, initial encounter     Time reflects when diagnosis was documented in both MDM as applicable and the Disposition within this note     Time User Action Codes Description Comment    1/26/2022  6:30 PM Nalini Noguera Add [W19  Javier Lasso, initial encounter       ED Disposition     ED Disposition Condition Date/Time Comment    Discharge Stable Wed Jan 26, 2022  5:45 PM Sebastian Pat discharge to home/self care              Follow-up Information     Follow up With Specialties Details Why Contact Info Additional Information    Mike Esquivel MD Family Medicine Schedule an appointment as soon as possible for a visit   [address] [place] [phone]  [address] [place] [address] Emergency Department Emergency Medicine  If symptoms worsen [facility] [phone]  [address] [place] [address] Emergency Department, [address] [place] [address], [id number]          Discharge Medication List as of 1/26/2022  6:33 PM      CONTINUE these medications which have NOT CHANGED    Details   ALPRAZolam (XANAX) 0 25 mg tablet Take 0 25 mg by mouth 2 (two) times a day, Starting Tue 11/9/2021, Historical Med Cholecalciferol 50 MCG (2000 UT) CAPS Take 1 capsule by mouth daily, Starting Tue 9/21/2021, Historical Med      clopidogrel (PLAVIX) 75 mg tablet Take 1 tablet by mouth daily, Starting Mon 10/25/2021, Historical Med      dexlansoprazole (Dexilant) 30 MG capsule Take 1 capsule by mouth daily, Starting Mon 10/25/2021, Historical Med      Diclofenac Sodium (VOLTAREN) 1 % Apply 1 application topically 4 (four) times a day, Starting Thu 2/25/2021, Until Fri 2/25/2022, Historical Med      DULoxetine (CYMBALTA) 60 mg delayed release capsule Take 60 mg by mouth daily, Historical Med      Empagliflozin 10 MG TABS Take 10 mg by mouth daily, Starting Tue 7/20/2021, Historical Med      insulin aspart, w/niacinamide, (FIASP) 100 Units/mL injection pen INJECT 16 UNITS BEFORE BREAKFAST, 16 UNITS BEFORE LUNCH AND 12- 16 UNITS BEFORE DINNER (PLUS ONE UNIT FOR PRIMING THREE TIMES DAILY), Historical Med      losartan-hydrochlorothiazide (HYZAAR) 50-12 5 mg per tablet Take 1 tablet by mouth daily, Starting Mon 10/25/2021, Historical Med                 PDMP Review     None          ED Provider  Electronically Signed by           Chinyere John PA-C  01/27/22 2001